# Patient Record
Sex: FEMALE | Employment: STUDENT | URBAN - METROPOLITAN AREA
[De-identification: names, ages, dates, MRNs, and addresses within clinical notes are randomized per-mention and may not be internally consistent; named-entity substitution may affect disease eponyms.]

---
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
## 2025-06-21 ENCOUNTER — HOSPITAL ENCOUNTER (EMERGENCY)
Age: 20
Discharge: HOME | End: 2025-06-21
Attending: EMERGENCY MEDICINE
Payer: MEDICAID

## 2025-06-21 VITALS
OXYGEN SATURATION: 99 % | DIASTOLIC BLOOD PRESSURE: 65 MMHG | TEMPERATURE: 98 F | RESPIRATION RATE: 16 BRPM | WEIGHT: 150 LBS | SYSTOLIC BLOOD PRESSURE: 112 MMHG | BODY MASS INDEX: 26.58 KG/M2 | HEART RATE: 65 BPM | HEIGHT: 63 IN

## 2025-06-21 DIAGNOSIS — A08.4 VIRAL GASTROENTERITIS: Primary | ICD-10-CM

## 2025-06-21 LAB
ALBUMIN SERPL-MCNC: 4.9 G/DL (ref 3.2–4.8)
ALBUMIN/GLOB SERPL: 1.4 {RATIO} (ref 1–2)
ALP LIVER SERPL-CCNC: 73 U/L (ref 52–144)
ALT SERPL-CCNC: 41 U/L (ref 10–49)
ANION GAP SERPL CALC-SCNC: 7 MMOL/L (ref 0–18)
AST SERPL-CCNC: 25 U/L (ref ?–34)
B-HCG UR QL: NEGATIVE
BASOPHILS # BLD AUTO: 0.03 X10(3) UL (ref 0–0.2)
BASOPHILS NFR BLD AUTO: 0.3 %
BILIRUB SERPL-MCNC: 0.6 MG/DL (ref 0.3–1.2)
BILIRUB UR QL STRIP.AUTO: NEGATIVE
BUN BLD-MCNC: 11 MG/DL (ref 9–23)
CALCIUM BLD-MCNC: 9.4 MG/DL (ref 8.7–10.6)
CHLORIDE SERPL-SCNC: 103 MMOL/L (ref 98–112)
CO2 SERPL-SCNC: 25 MMOL/L (ref 21–32)
COLOR UR AUTO: YELLOW
CREAT BLD-MCNC: 0.75 MG/DL (ref 0.55–1.02)
EGFRCR SERPLBLD CKD-EPI 2021: 117 ML/MIN/1.73M2 (ref 60–?)
EOSINOPHIL # BLD AUTO: 0.4 X10(3) UL (ref 0–0.7)
EOSINOPHIL NFR BLD AUTO: 4.6 %
ERYTHROCYTE [DISTWIDTH] IN BLOOD BY AUTOMATED COUNT: 11.9 %
GLOBULIN PLAS-MCNC: 3.4 G/DL (ref 2–3.5)
GLUCOSE BLD-MCNC: 100 MG/DL (ref 70–99)
GLUCOSE UR STRIP.AUTO-MCNC: NEGATIVE MG/DL
HCT VFR BLD AUTO: 41.2 % (ref 35–48)
HGB BLD-MCNC: 14.1 G/DL (ref 12–16)
IMM GRANULOCYTES # BLD AUTO: 0.05 X10(3) UL (ref 0–1)
IMM GRANULOCYTES NFR BLD: 0.6 %
KETONES UR STRIP.AUTO-MCNC: NEGATIVE MG/DL
LEUKOCYTE ESTERASE UR QL STRIP.AUTO: NEGATIVE
LYMPHOCYTES # BLD AUTO: 0.72 X10(3) UL (ref 1–4)
LYMPHOCYTES NFR BLD AUTO: 8.2 %
MCH RBC QN AUTO: 31.9 PG (ref 26–34)
MCHC RBC AUTO-ENTMCNC: 34.2 G/DL (ref 31–37)
MCV RBC AUTO: 93.2 FL (ref 80–100)
MONOCYTES # BLD AUTO: 0.45 X10(3) UL (ref 0.1–1)
MONOCYTES NFR BLD AUTO: 5.2 %
NEUTROPHILS # BLD AUTO: 7.08 X10 (3) UL (ref 1.5–7.7)
NEUTROPHILS # BLD AUTO: 7.08 X10(3) UL (ref 1.5–7.7)
NEUTROPHILS NFR BLD AUTO: 81.1 %
NITRITE UR QL STRIP.AUTO: NEGATIVE
OSMOLALITY SERPL CALC.SUM OF ELEC: 279 MOSM/KG (ref 275–295)
PH UR STRIP.AUTO: 6.5 [PH] (ref 5–8)
PLATELET # BLD AUTO: 271 10(3)UL (ref 150–450)
POTASSIUM SERPL-SCNC: 3.8 MMOL/L (ref 3.5–5.1)
PROT SERPL-MCNC: 8.3 G/DL (ref 5.7–8.2)
PROT UR STRIP.AUTO-MCNC: NEGATIVE MG/DL
RBC # BLD AUTO: 4.42 X10(6)UL (ref 3.8–5.3)
SODIUM SERPL-SCNC: 135 MMOL/L (ref 136–145)
SP GR UR STRIP.AUTO: <=1.005 (ref 1–1.03)
UROBILINOGEN UR STRIP.AUTO-MCNC: 0.2 MG/DL
WBC # BLD AUTO: 8.7 X10(3) UL (ref 4–11)

## 2025-06-21 PROCEDURE — 96375 TX/PRO/DX INJ NEW DRUG ADDON: CPT

## 2025-06-21 PROCEDURE — 99284 EMERGENCY DEPT VISIT MOD MDM: CPT

## 2025-06-21 PROCEDURE — 85025 COMPLETE CBC W/AUTO DIFF WBC: CPT | Performed by: NURSE PRACTITIONER

## 2025-06-21 PROCEDURE — 96361 HYDRATE IV INFUSION ADD-ON: CPT

## 2025-06-21 PROCEDURE — 81015 MICROSCOPIC EXAM OF URINE: CPT | Performed by: NURSE PRACTITIONER

## 2025-06-21 PROCEDURE — 80053 COMPREHEN METABOLIC PANEL: CPT | Performed by: NURSE PRACTITIONER

## 2025-06-21 PROCEDURE — 81001 URINALYSIS AUTO W/SCOPE: CPT | Performed by: NURSE PRACTITIONER

## 2025-06-21 PROCEDURE — 81025 URINE PREGNANCY TEST: CPT

## 2025-06-21 PROCEDURE — 96374 THER/PROPH/DIAG INJ IV PUSH: CPT

## 2025-06-21 RX ORDER — ONDANSETRON 4 MG/1
4 TABLET, ORALLY DISINTEGRATING ORAL EVERY 6 HOURS PRN
Qty: 20 TABLET | Refills: 0 | Status: SHIPPED | OUTPATIENT
Start: 2025-06-21 | End: 2025-06-28

## 2025-06-21 RX ORDER — ONDANSETRON 2 MG/ML
4 INJECTION INTRAMUSCULAR; INTRAVENOUS ONCE
Status: COMPLETED | OUTPATIENT
Start: 2025-06-21 | End: 2025-06-21

## 2025-06-21 RX ORDER — FAMOTIDINE 10 MG/ML
20 INJECTION, SOLUTION INTRAVENOUS ONCE
Status: COMPLETED | OUTPATIENT
Start: 2025-06-21 | End: 2025-06-21

## 2025-06-21 RX ORDER — DICYCLOMINE HCL 20 MG
20 TABLET ORAL 4 TIMES DAILY PRN
Qty: 30 TABLET | Refills: 0 | Status: SHIPPED | OUTPATIENT
Start: 2025-06-21 | End: 2025-07-21

## 2025-06-21 NOTE — ED INITIAL ASSESSMENT (HPI)
Patient here with diarrhea since yesterday. Generalized abdominal pain. States started this morning with generalized weakness.

## 2025-06-21 NOTE — DISCHARGE INSTRUCTIONS
Rest and push plenty of fluids.   Take it easy on your stomach over the next few days.   Keep to a bland diet and avoid any spicy, greasy, citrus, or fried foods.   Use the Zofran as needed for nasuea/vomiting.   Take Pepcid over the counter 20 mg twice a day for the next few days.   Use the Bentyl as needed for crampy abdominal pain and diarrhea.   You can also use Imodium over the counter.   Follow up with your PCP in 3-5 days.     Thank you for choosing [facility] for your care.

## 2025-06-21 NOTE — ED PROVIDER NOTES
Patient Seen in: ward Emergency Department In [place]        History  Chief Complaint   Patient presents with    Nausea/Vomiting/Diarrhea     Stated Complaint: sent from the pediatrician , diarrhea    Subjective:   21 yo female presents to the emergency department with c/o diarrhea.  Patient started yesterday with diarrhea, nausea and chills.  She has had about 10 episodes of watery diarrhea since this started.  Mom mentions that her sister had similar symptoms 3 days ago, but was having more vomiting than diarrhea.  Patient states she had some blurry vision and lightheadedness this morning.  She went to see her pediatrician and was referred to the ER.  She denies any fever, nasal congestion, runny nose, sore throat, difficulty swallowing, voice changes, shortness of breath, chest pain, abdominal pain, or urinary symptoms.      The history is provided by the patient and a parent.                   Objective:     History reviewed. No pertinent past medical history.           Past Surgical History:   Procedure Laterality Date    Knee surgery                  Social History     Socioeconomic History    Marital status: Single   Tobacco Use    Smoking status: Never    Smokeless tobacco: Never     Social Drivers of Health     Food Insecurity: No Food Insecurity (12/19/2024)    Received from [facility]    Hunger Vital Sign     Worried About Running Out of Food in the Last Year: Never true     Ran Out of Food in the Last Year: Never true   Transportation Needs: No Transportation Needs (12/19/2024)    Received from [facility]    PRAPARE - Transportation     Lack of Transportation (Medical): No     Lack of Transportation (Non-Medical): No   Housing Stability: Unknown (12/19/2024)    Received from [facility]    Housing Stability Vital Sign     Unable to Pay for Housing in the Last Year: No                                Physical Exam    ED Triage Vitals  [06/21/25 1210]   /63   Pulse 72   Resp 18   Temp 98.4 °F (36.9 °C)   Temp src Oral   SpO2 100 %   O2 Device None (Room air)       Current Vitals:   Vital Signs  BP: 110/63  Pulse: 72  Resp: 18  Temp: 98.4 °F (36.9 °C)  Temp src: Oral    Oxygen Therapy  SpO2: 100 %  O2 Device: None (Room air)            Physical Exam  Vitals and nursing note reviewed.   Constitutional:       General: She is not in acute distress.     Appearance: Normal appearance. She is normal weight. She is not ill-appearing.   HENT:      Head: Normocephalic and atraumatic.      Right Ear: Tympanic membrane, ear canal and external ear normal.      Left Ear: Tympanic membrane, ear canal and external ear normal.      Nose: Nose normal.      Mouth/Throat:      Mouth: Mucous membranes are moist.      Pharynx: Oropharynx is clear.   Eyes:      Conjunctiva/sclera: Conjunctivae normal.      Pupils: Pupils are equal, round, and reactive to light.   Cardiovascular:      Rate and Rhythm: Normal rate and regular rhythm.      Pulses: Normal pulses.      Heart sounds: Normal heart sounds.   Pulmonary:      Effort: Pulmonary effort is normal. No respiratory distress.      Breath sounds: Normal breath sounds.   Abdominal:      General: Abdomen is flat. Bowel sounds are normal. There is no distension.      Palpations: Abdomen is soft.      Tenderness: There is generalized abdominal tenderness. There is no right CVA tenderness or left CVA tenderness. Negative signs include Ruiz's sign.      Comments: Mild generalized tenderness with palpation of the abdomen.     Musculoskeletal:         General: Normal range of motion.   Skin:     General: Skin is warm and dry.   Neurological:      General: No focal deficit present.      Mental Status: She is alert and oriented to person, place, and time.   Psychiatric:         Mood and Affect: Mood normal.         Behavior: Behavior normal.               ED Course  Labs Reviewed   COMP METABOLIC PANEL (14) - Abnormal;  Notable for the following components:       Result Value    Glucose 100 (*)     Sodium 135 (*)     Total Protein 8.3 (*)     Albumin 4.9 (*)     All other components within normal limits   CBC WITH DIFFERENTIAL WITH PLATELET - Abnormal; Notable for the following components:    Lymphocyte Absolute 0.72 (*)     All other components within normal limits   URINALYSIS WITH CULTURE REFLEX - Abnormal; Notable for the following components:    Clarity Urine Hazy (*)     Blood Urine Trace-lysed (*)     All other components within normal limits   UA MICROSCOPIC ONLY, URINE - Abnormal; Notable for the following components:    Squamous Epi. Cells Moderate (*)     All other components within normal limits   POCT PREGNANCY URINE - Normal       ED Course as of 06/21/25 1341  ------------------------------------------------------------  Time: 06/21 1227  Value: CBC With Differential With Platelet(!)  Comment: Leukocytosis with no elevated WBC.  H&H is stable at 14.1 and 41.2.  ------------------------------------------------------------  Time: 06/21 1242  Value: Urinalysis with Culture Reflex(!)  Comment: Positive for trace blood.  No other indication of infection or pathology.   ------------------------------------------------------------  Time: 06/21 1242  Value: POCT Pregnancy, Urine  Comment: Negative.   ------------------------------------------------------------  Time: 06/21 1251  Value: UA Microscopic only, urine(!)  Comment: Moderate squamous with no bacteria visualized.  ------------------------------------------------------------  Time: 06/21 1251  Value: Comp Metabolic Panel (14)(!)  Comment: Mild hyponatremia at 135.  Patient did receive a liter of IV fluids.  Otherwise all other labs unremarkable and essentially normal.                     MDM       Medical Decision Making  19 yo female with nausea and diarrhea that started yesterday.  Sister was ill with similar symptoms a few days earlier.  CBC, CMP, UA, UCG, IV  fluids, Zofran,and Pepcid ordered.      Patient states she feels better after IV fluids and medication.  Labs are unremarkable and essentially normal.  Feel this is likely a viral gastroenteritis.  No evidence of sepsis, UTI, pyelonephritis, bacterial colitis, enteritis or other bacterial etiology.  Zofran and Bentyl prescribed for home use.  She can also use Pepcid over the counter.  Keep to a bland diet and push fluids over the next few days.  Follow up with her PCP in 3-5 days.     Amount and/or Complexity of Data Reviewed  Labs: ordered. Decision-making details documented in ED Course.    Risk  OTC drugs.  Prescription drug management.        Disposition and Plan     Clinical Impression:  1. Viral gastroenteritis         Disposition:  Discharge  6/21/2025  1:39 pm    Follow-up:  No follow-up provider specified.        Medications Prescribed:  Current Discharge Medication List        START taking these medications    Details   ondansetron 4 MG Oral Tablet Dispersible Take 1 tablet (4 mg total) by mouth every 6 (six) hours as needed.  Qty: 20 tablet, Refills: 0      dicyclomine 20 MG Oral Tab Take 1 tablet (20 mg total) by mouth 4 (four) times daily as needed.  Qty: 30 tablet, Refills: 0                   Supplementary Documentation:

## (undated) NOTE — LETTER
Patient Name: Ismael Montelongo  YOB: 2005          MRN :  [id number]  Date:  1/29/2020  Referring Physician:  Renate Valero    Discharge Summary  Pt has attended 13 visits in Physical Therapy.    Subjective: Pt reports no issues, questio treatment options and has agreed to actively participate in planning and for this course of care.     Thank you for your referral. If you have any questions, please contact me at Dept: [phone]    Sincerely,  Electronically signed by therapist: Ramses Gonzales Triple hop testing x 3 reps R/L  Box jumps 10 reps double leg   Depth jumps 10 reps  With stepping R/L alternating 10 reps    Shuttle single leg press level 7, 8: L: 30: R: 30 x 2 sets  Walking lunges down and back 30'  X 2 reps - alt  SLS ball throw later Step down 6 inch 15 reps R/L  Walking Lunges 30' down and back   Agility Drills:    Forward 2 reps; lateral 2 reps; diagonal 2 reps- % speed; alt single/double hopping 10 reps R/L; single leg hopping 10 reps x 2 sets R/L   Box jumps 10 reps double leg